# Patient Record
Sex: FEMALE | Race: WHITE | Employment: PART TIME | URBAN - METROPOLITAN AREA
[De-identification: names, ages, dates, MRNs, and addresses within clinical notes are randomized per-mention and may not be internally consistent; named-entity substitution may affect disease eponyms.]

---
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
## 2023-08-02 ENCOUNTER — TELEPHONE (OUTPATIENT)
Dept: DERMATOLOGY | Age: 18
End: 2023-08-02

## 2023-08-02 NOTE — TELEPHONE ENCOUNTER
Patient is insured with [employer]. I left a message on the voicemail informing the patient that [facility]) is no longer in contract with [employer]. The Dermatology appointment scheduled for 8/3/2023 is cancelled and to reschedule with a new insurance plan.

## 2024-05-13 ENCOUNTER — OFFICE VISIT (OUTPATIENT)
Dept: OBGYN CLINIC | Age: 19
End: 2024-05-13
Payer: COMMERCIAL

## 2024-05-13 VITALS
HEIGHT: 61 IN | DIASTOLIC BLOOD PRESSURE: 68 MMHG | WEIGHT: 126.3 LBS | HEART RATE: 77 BPM | SYSTOLIC BLOOD PRESSURE: 106 MMHG | BODY MASS INDEX: 23.85 KG/M2

## 2024-05-13 DIAGNOSIS — N92.6 IRREGULAR PERIODS: Primary | ICD-10-CM

## 2024-05-13 PROBLEM — E03.9 HYPOTHYROIDISM, UNSPECIFIED: Status: ACTIVE | Noted: 2023-10-30

## 2024-05-13 PROBLEM — E88.819 INSULIN RESISTANCE, UNSPECIFIED: Status: ACTIVE | Noted: 2024-05-09

## 2024-05-13 PROCEDURE — G8420 CALC BMI NORM PARAMETERS: HCPCS

## 2024-05-13 PROCEDURE — 1036F TOBACCO NON-USER: CPT

## 2024-05-13 PROCEDURE — G8427 DOCREV CUR MEDS BY ELIG CLIN: HCPCS

## 2024-05-13 PROCEDURE — 99204 OFFICE O/P NEW MOD 45 MIN: CPT

## 2024-05-13 RX ORDER — VENLAFAXINE HYDROCHLORIDE 37.5 MG/1
37.5 CAPSULE, EXTENDED RELEASE ORAL DAILY
COMMUNITY
Start: 2024-04-01

## 2024-05-13 RX ORDER — MINOCYCLINE HYDROCHLORIDE 100 MG/1
100 CAPSULE ORAL DAILY
COMMUNITY
Start: 2024-05-09

## 2024-05-13 RX ORDER — SPIRONOLACTONE 50 MG/1
50 TABLET, FILM COATED ORAL DAILY
COMMUNITY
Start: 2024-05-09

## 2024-05-13 SDOH — ECONOMIC STABILITY: FOOD INSECURITY: WITHIN THE PAST 12 MONTHS, YOU WORRIED THAT YOUR FOOD WOULD RUN OUT BEFORE YOU GOT MONEY TO BUY MORE.: NEVER TRUE

## 2024-05-13 SDOH — ECONOMIC STABILITY: INCOME INSECURITY: HOW HARD IS IT FOR YOU TO PAY FOR THE VERY BASICS LIKE FOOD, HOUSING, MEDICAL CARE, AND HEATING?: NOT HARD AT ALL

## 2024-05-13 SDOH — ECONOMIC STABILITY: FOOD INSECURITY: WITHIN THE PAST 12 MONTHS, THE FOOD YOU BOUGHT JUST DIDN'T LAST AND YOU DIDN'T HAVE MONEY TO GET MORE.: NEVER TRUE

## 2024-05-13 SDOH — ECONOMIC STABILITY: HOUSING INSECURITY
IN THE LAST 12 MONTHS, WAS THERE A TIME WHEN YOU DID NOT HAVE A STEADY PLACE TO SLEEP OR SLEPT IN A SHELTER (INCLUDING NOW)?: NO

## 2024-05-13 ASSESSMENT — PATIENT HEALTH QUESTIONNAIRE - PHQ9
2. FEELING DOWN, DEPRESSED OR HOPELESS: NOT AT ALL
SUM OF ALL RESPONSES TO PHQ QUESTIONS 1-9: 0
SUM OF ALL RESPONSES TO PHQ9 QUESTIONS 1 & 2: 0
1. LITTLE INTEREST OR PLEASURE IN DOING THINGS: NOT AT ALL

## 2024-05-13 NOTE — PROGRESS NOTES
Subjective:  Chief Complaint   Patient presents with    New Patient    Irregular Menses     HPI:  Violeta Mccollum is a 18 y.o. female who arrives to office as a new patient.    Violeta reports she has been having irregular periods for over a year. They typically occur at least twice a month with a two week break in between, sometimes shorter. This month she is a few weeks late. Sees endocrinologist at [facility] for significant for thyroid issues and for history of growth hormone deficiency. Reports she is also on spironolactone for acne, was on 25 mg, just bumped it up to 50 mg just this week.    Violeta is open to birth control but has concerns for PCOS given her preexisting hormonal issues.    Review of Systems   Constitutional: Negative.    Respiratory: Negative.     Cardiovascular: Negative.    Gastrointestinal: Negative.    Endocrine: Negative.    Genitourinary:  Positive for menstrual problem.   Skin: Negative.         acne   Neurological: Negative.    Psychiatric/Behavioral: Negative.       Objective:  Vitals:    05/13/24 1518   BP: 106/68   Pulse: 77   Weight: 57.3 kg (126 lb 4.8 oz)   Height: 1.549 m (5' 1\")      Body mass index is 23.86 kg/m².  Patient's last menstrual period was 03/30/2024.  Current Outpatient Medications on File Prior to Visit   Medication Sig Dispense Refill    spironolactone (ALDACTONE) 50 MG tablet Take 1 tablet by mouth daily      minocycline (MINOCIN;DYNACIN) 100 MG capsule Take 1 capsule by mouth daily      venlafaxine (EFFEXOR XR) 37.5 MG extended release capsule Take 1 capsule by mouth daily       No current facility-administered medications on file prior to visit.      Past Medical History:   Diagnosis Date    Acne     Anxiety and depression     Dwarfism     petite     Patient has never had pap smear      Physical Exam  Constitutional:       Appearance: Normal appearance.   HENT:      Head: Normocephalic.   Cardiovascular:      Rate and Rhythm: Normal rate and regular

## 2024-05-14 ASSESSMENT — ENCOUNTER SYMPTOMS
ROS SKIN COMMENTS: ACNE
RESPIRATORY NEGATIVE: 1
GASTROINTESTINAL NEGATIVE: 1

## 2024-05-21 DIAGNOSIS — N92.6 IRREGULAR PERIODS: ICD-10-CM

## 2024-06-04 DIAGNOSIS — N92.6 IRREGULAR PERIODS: ICD-10-CM

## 2024-07-22 DIAGNOSIS — N92.6 IRREGULAR PERIODS: ICD-10-CM

## 2024-07-22 DIAGNOSIS — E28.2 PCOS (POLYCYSTIC OVARIAN SYNDROME): Primary | ICD-10-CM

## 2024-07-22 RX ORDER — DROSPIRENONE AND ETHINYL ESTRADIOL 0.03MG-3MG
1 KIT ORAL DAILY
Qty: 1 PACKET | Refills: 2 | Status: SHIPPED | OUTPATIENT
Start: 2024-07-22

## 2024-10-18 DIAGNOSIS — N92.6 IRREGULAR PERIODS: ICD-10-CM

## 2024-10-22 RX ORDER — DROSPIRENONE AND ETHINYL ESTRADIOL 0.03MG-3MG
1 KIT ORAL DAILY
Qty: 28 TABLET | Refills: 3 | Status: SHIPPED | OUTPATIENT
Start: 2024-10-22

## 2024-10-22 NOTE — TELEPHONE ENCOUNTER
Violeta Mccollum is requesting a refill on 10/22/24.     Last Annual visit:  5/13/24  Next Office visit:  none scheduled       Last prescribing provider: Liliane GUZMAN